# Patient Record
Sex: FEMALE | Race: WHITE | NOT HISPANIC OR LATINO | Employment: OTHER | URBAN - METROPOLITAN AREA
[De-identification: names, ages, dates, MRNs, and addresses within clinical notes are randomized per-mention and may not be internally consistent; named-entity substitution may affect disease eponyms.]

---

## 2021-05-15 ENCOUNTER — HOSPITAL ENCOUNTER (OUTPATIENT)
Dept: TELEMEDICINE | Facility: HOSPITAL | Age: 51
Discharge: HOME | End: 2021-05-15
Payer: MEDICAID

## 2021-05-15 DIAGNOSIS — R20.2 NUMBNESS AND TINGLING: ICD-10-CM

## 2021-05-15 DIAGNOSIS — R20.0 NUMBNESS AND TINGLING: ICD-10-CM

## 2021-05-15 PROCEDURE — 99203 OFFICE O/P NEW LOW 30 MIN: CPT | Mod: 95,,, | Performed by: PSYCHIATRY & NEUROLOGY

## 2021-05-15 PROCEDURE — 99203 PR OFFICE/OUTPT VISIT, NEW, LEVL III, 30-44 MIN: ICD-10-PCS | Mod: 95,,, | Performed by: PSYCHIATRY & NEUROLOGY

## 2022-02-28 ENCOUNTER — TELEPHONE (OUTPATIENT)
Dept: BARIATRICS | Facility: CLINIC | Age: 52
End: 2022-02-28
Payer: MEDICAID

## 2022-02-28 NOTE — TELEPHONE ENCOUNTER
----- Message from Karin Meeks sent at 2/28/2022 11:34 AM CST -----  Regarding: scheduling  Contact: pt  Pt requesting a call back in regards to scheduling an appt.      Pt @ [phone] or [phone]

## 2022-05-22 ENCOUNTER — HOSPITAL ENCOUNTER (OUTPATIENT)
Dept: TELEMEDICINE | Facility: HOSPITAL | Age: 52
Discharge: HOME | End: 2022-05-22
Payer: MEDICAID

## 2022-05-22 DIAGNOSIS — I63.512 ACUTE ISCHEMIC LEFT MCA STROKE: ICD-10-CM

## 2022-05-22 PROCEDURE — 99205 OFFICE O/P NEW HI 60 MIN: CPT | Mod: 95,,, | Performed by: PSYCHIATRY & NEUROLOGY

## 2022-05-22 PROCEDURE — 99205 PR OFFICE/OUTPT VISIT, NEW, LEVL V, 60-74 MIN: ICD-10-PCS | Mod: 95,,, | Performed by: PSYCHIATRY & NEUROLOGY

## 2022-05-22 NOTE — SUBJECTIVE & OBJECTIVE
Woke up with symptoms?: no    Recent bleeding noted: no  Does the patient take any Blood Thinners? yes  Medications: Antiplatelets:  aspirin and clopidogrel/Plavix      Past Medical History: hypertension, diabetes, hyperlipidemia, MI/CAD, stroke and Heart Problems    Past Surgical History: 3x cardiac stent    Family History: no relevant history    Social History: no smoking, no drinking, no drugs    Allergies: Allergies have not been reviewed see chart    Review of Systems   Review of Symptoms:   Constitutional: Denies fevers or chills.  ENT: no hearing difficulty, no visual changes  Pulmonary: Denies shortness of breath or cough.  Cardiology: Denies chest pain or palpitations.  GI: Denies abdominal pain or constipation.  Neurologic: right face numbness and weakness to right body   : no dysuria  Musk: no muscle pain, no joint pain  Psych: no hallucinations      Objective:   Vitals: There were no vitals taken for this visit. Height: 5.0, Weight: 283 lbs, BP: 132/66, Respiratory Rate: 18 and Heart Rate: 78    CT READ: Yes  No hemmorhage. No mass effect. No early infarct signs.  prior strokes, lacunes    Physical Exam    Physical Exam:  GA: Alert, comfortable, no acute distress.   Pulmonary: normal chest rise  Abdominal: no distension appreciated  Skin: no visible lesions on exposed skin  obese  Neuro:  --GCS: 15  --Mental Status:  aox3  --CN II-XII grossly intact.   --EOMI   --brainstem: intact  --Motor:right leg drift; difficulty lifting leg on right that feels like yesterday only worse  --sensory: decreased sensation to right side  --Reflexes: not tested  --Gait: deferred

## 2022-05-22 NOTE — HPI
52 yo F with HTN, HLD, DM, MI, prior CVAs, ex cardiac stents on asa and plvx. Complaining of 2 days of intermittent right sided numbness and tingling and right sided weakness. This at 1100 she felt her right sided weakness was worse.

## 2022-05-22 NOTE — CONSULTS
[facility]  Vascular Neurology  Comprehensive Stroke Center  TeleVascular Neurology Acute Consultation Note      Consults    Consulting Provider: JUNG ARNOLD  Current Providers  No providers found    Patient Location: [facility] - [facility] Emergency Department  Spoke hospital nurse at bedside with patient assisting consultant.     Patient information was obtained from patient.         Assessment/Plan:       Diagnoses:   Acute ischemic left MCA stroke  50 yo F with HTN, HLD, DM, MI, prior CVAs, ex cardiac stents on asa and plvx. Complaining of 2 days of intermittent right sided numbness and tingling and right sided weakness. This at 1100 she felt her right sided weakness was worse.    Antithrombotics for secondary stroke prevention: Antiplatelets: Aspirin: 81 mg daily  Clopidogrel: 75 mg daily    Statins for secondary stroke prevention and hyperlipidemia, if present:   Statins: Atorvastatin- 80 mg daily    Aggressive risk factor modification: HTN, DM, HLD, Diet, Exercise, Obesity     Rehab efforts: The patient has been evaluated by a stroke team provider and the therapy needs have been fully considered based off the presenting complaints and exam findings. The following therapy evaluations are needed: PT evaluate and treat, OT evaluate and treat    Diagnostics ordered/pending: CTA Head to assess vasculature , CTA Neck/Arch to assess vasculature, HgbA1C to assess blood glucose levels, Lipid Profile to assess cholesterol levels, MRI head without contrast to assess brain parenchyma, TTE to assess cardiac function/status , TSH to assess thyroid function    VTE prophylaxis: Heparin 5000 units SQ every 8 hours    BP parameters: Infarct: No intervention, SBP <220 uunless MRI negative for acute stroke            STROKE DOCUMENTATION     Acute Stroke Times:   Acute Stroke Times   Last Known Normal Date: 05/21/22  Last Known Normal Time:  1100  Symptom Onset Date: 05/22/22  Symptom Onset Time: 1100  Stroke Team Called Date: 05/22/22  Stroke Team Called Time: 1443  Stroke Team Arrival Date: 05/22/22  Stroke Team Arrival Time: 1145  CT Interpretation Time: 1449  Alteplase Recommended: No  Thrombectomy Recommended: No    NIH Scale:  Interval: baseline  1a. Level of Consciousness: 0-->Alert, keenly responsive  1b. LOC Questions: 0-->Answers both questions correctly  1c. LOC Commands: 0-->Performs both tasks correctly  2. Best Gaze: 0-->Normal  3. Visual: 0-->No visual loss  4. Facial Palsy: 0-->Normal symmetrical movements  5a. Motor Arm, Left: 0-->No drift, limb holds 90 (or 45) degrees for full 10 secs  5b. Motor Arm, Right: 0-->No drift, limb holds 90 (or 45) degrees for full 10 secs  6a. Motor Leg, Left: 0-->No drift, leg holds 30 degree position for full 5 secs  6b. Motor Leg, Right: 1-->Drift, leg falls by the end of the 5-sec period but does not hit bed  7. Limb Ataxia: 0-->Absent  8. Sensory: 1-->Mild-to-moderate sensory loss, patient feels pinprick is less sharp or is dull on the affected side, or there is a loss of superficial pain with pinprick, but patient is aware of being touched  9. Best Language: 0-->No aphasia, normal  10. Dysarthria: 0-->Normal  11. Extinction and Inattention (formerly Neglect): 0-->No abnormality  Total (NIH Stroke Scale): 2     Modified Buffalo Score: 0  Boomer Coma Scale:15   ABCD2 Score:    [id number] Score:   HAS -BLED Score:   ICH Score:   Hunt & Rodríguez Classification:       There were no vitals taken for this visit.  Alteplase Eligible?: Yes  Alteplase Recommendation: Alteplase not recommended due to Outside of treatment window  and Unknown/unclear onset   Possible Interventional Revascularization Candidate? No; no significant neurologic deficit (NIHSS <6)  and No; at this time symptoms not suggestive of large vessel occlusion    Disposition Recommendation: admit to inpatient    Subjective:     History of Present  Illness:  50 yo F with HTN, HLD, DM, MI, prior CVAs, ex cardiac stents on asa and plvx. Complaining of 2 days of intermittent right sided numbness and tingling and right sided weakness. This at 1100 she felt her right sided weakness was worse.        Woke up with symptoms?: no    Recent bleeding noted: no  Does the patient take any Blood Thinners? yes  Medications: Antiplatelets:  aspirin and clopidogrel/Plavix      Past Medical History: hypertension, diabetes, hyperlipidemia, MI/CAD, stroke and Heart Problems    Past Surgical History: 3x cardiac stent    Family History: no relevant history    Social History: no smoking, no drinking, no drugs    Allergies: Allergies have not been reviewed see chart    Review of Systems   Review of Symptoms:   Constitutional: Denies fevers or chills.  ENT: no hearing difficulty, no visual changes  Pulmonary: Denies shortness of breath or cough.  Cardiology: Denies chest pain or palpitations.  GI: Denies abdominal pain or constipation.  Neurologic: right face numbness and weakness to right body   : no dysuria  Musk: no muscle pain, no joint pain  Psych: no hallucinations      Objective:   Vitals: There were no vitals taken for this visit. Height: 5.0, Weight: 283 lbs, BP: 132/66, Respiratory Rate: 18 and Heart Rate: 78    CT READ: Yes  No hemmorhage. No mass effect. No early infarct signs.  prior strokes, lacunes    Physical Exam    Physical Exam:  GA: Alert, comfortable, no acute distress.   Pulmonary: normal chest rise  Abdominal: no distension appreciated  Skin: no visible lesions on exposed skin  obese  Neuro:  --GCS: 15  --Mental Status:  aox3  --CN II-XII grossly intact.   --EOMI   --brainstem: intact  --Motor:right leg drift; difficulty lifting leg on right that feels like yesterday only worse  --sensory: decreased sensation to right side  --Reflexes: not tested  --Gait: deferred          Recommended the emergency room physician to have a brief discussion with the patient  and/or family if available regarding the  risks and benefits of treatment, and to briefly document the occurrence of that discussion in his clinical encounter note.     The attending portion of this evaluation, treatment, and documentation was performed per Yemi Mcgrath MD via audiovisual.    Billing code:  (non-intervention mild to moderate stroke, TIA, some mimics)    · This patient has a critical neurological condition/illness, with some potential for high morbidity and mortality.  · There is a moderate probability for acute neurological change leading to clinical and possibly life-threatening deterioration requiring highest level of physician preparedness for urgent intervention.  · Care was coordinated with other physicians involved in the patient's care.  · Radiologic studies and laboratory data were reviewed and interpreted, and plan of care was re-assessed based on the results.  · Diagnosis, treatment options and prognosis may have been discussed with the patient and/or family members or caregiver.      In your opinion, this was a: Tier 2 Van Negative    Consult End Time: 3:57 PM     Yemi Mcgrath MD  [facility]  Vascular Neurology   [facility]

## 2022-06-20 ENCOUNTER — OFFICE VISIT (OUTPATIENT)
Dept: OBSTETRICS AND GYNECOLOGY | Facility: CLINIC | Age: 52
End: 2022-06-20
Payer: MEDICAID

## 2022-06-20 VITALS
HEIGHT: 60 IN | WEIGHT: 284 LBS | BODY MASS INDEX: 55.76 KG/M2 | HEART RATE: 77 BPM | DIASTOLIC BLOOD PRESSURE: 83 MMHG | SYSTOLIC BLOOD PRESSURE: 132 MMHG

## 2022-06-20 DIAGNOSIS — Z01.419 WOMEN'S ANNUAL ROUTINE GYNECOLOGICAL EXAMINATION: Primary | ICD-10-CM

## 2022-06-20 DIAGNOSIS — Z12.4 CERVICAL CANCER SCREENING: ICD-10-CM

## 2022-06-20 PROCEDURE — 1159F PR MEDICATION LIST DOCUMENTED IN MEDICAL RECORD: ICD-10-PCS | Mod: CPTII,S$GLB,, | Performed by: STUDENT IN AN ORGANIZED HEALTH CARE EDUCATION/TRAINING PROGRAM

## 2022-06-20 PROCEDURE — 3075F SYST BP GE 130 - 139MM HG: CPT | Mod: CPTII,S$GLB,, | Performed by: STUDENT IN AN ORGANIZED HEALTH CARE EDUCATION/TRAINING PROGRAM

## 2022-06-20 PROCEDURE — 99386 PREV VISIT NEW AGE 40-64: CPT | Mod: S$GLB,,, | Performed by: STUDENT IN AN ORGANIZED HEALTH CARE EDUCATION/TRAINING PROGRAM

## 2022-06-20 PROCEDURE — 99386 PR PREVENTIVE VISIT,NEW,40-64: ICD-10-PCS | Mod: S$GLB,,, | Performed by: STUDENT IN AN ORGANIZED HEALTH CARE EDUCATION/TRAINING PROGRAM

## 2022-06-20 PROCEDURE — 1160F RVW MEDS BY RX/DR IN RCRD: CPT | Mod: CPTII,S$GLB,, | Performed by: STUDENT IN AN ORGANIZED HEALTH CARE EDUCATION/TRAINING PROGRAM

## 2022-06-20 PROCEDURE — 3008F PR BODY MASS INDEX (BMI) DOCUMENTED: ICD-10-PCS | Mod: CPTII,S$GLB,, | Performed by: STUDENT IN AN ORGANIZED HEALTH CARE EDUCATION/TRAINING PROGRAM

## 2022-06-20 PROCEDURE — 1160F PR REVIEW ALL MEDS BY PRESCRIBER/CLIN PHARMACIST DOCUMENTED: ICD-10-PCS | Mod: CPTII,S$GLB,, | Performed by: STUDENT IN AN ORGANIZED HEALTH CARE EDUCATION/TRAINING PROGRAM

## 2022-06-20 PROCEDURE — 3079F PR MOST RECENT DIASTOLIC BLOOD PRESSURE 80-89 MM HG: ICD-10-PCS | Mod: CPTII,S$GLB,, | Performed by: STUDENT IN AN ORGANIZED HEALTH CARE EDUCATION/TRAINING PROGRAM

## 2022-06-20 PROCEDURE — 3079F DIAST BP 80-89 MM HG: CPT | Mod: CPTII,S$GLB,, | Performed by: STUDENT IN AN ORGANIZED HEALTH CARE EDUCATION/TRAINING PROGRAM

## 2022-06-20 PROCEDURE — 3075F PR MOST RECENT SYSTOLIC BLOOD PRESS GE 130-139MM HG: ICD-10-PCS | Mod: CPTII,S$GLB,, | Performed by: STUDENT IN AN ORGANIZED HEALTH CARE EDUCATION/TRAINING PROGRAM

## 2022-06-20 PROCEDURE — 3008F BODY MASS INDEX DOCD: CPT | Mod: CPTII,S$GLB,, | Performed by: STUDENT IN AN ORGANIZED HEALTH CARE EDUCATION/TRAINING PROGRAM

## 2022-06-20 PROCEDURE — 1159F MED LIST DOCD IN RCRD: CPT | Mod: CPTII,S$GLB,, | Performed by: STUDENT IN AN ORGANIZED HEALTH CARE EDUCATION/TRAINING PROGRAM

## 2022-06-20 RX ORDER — PRAZOSIN HYDROCHLORIDE 1 MG/1
1 CAPSULE ORAL 2 TIMES DAILY
COMMUNITY
End: 2022-09-06

## 2022-06-20 RX ORDER — ALBUTEROL SULFATE 90 UG/1
AEROSOL, METERED RESPIRATORY (INHALATION)
COMMUNITY
Start: 2022-04-25

## 2022-06-20 RX ORDER — FAMOTIDINE 20 MG/1
TABLET, FILM COATED ORAL
COMMUNITY
End: 2022-09-06

## 2022-06-20 RX ORDER — ALBUTEROL SULFATE 0.83 MG/ML
2.5 SOLUTION RESPIRATORY (INHALATION)
COMMUNITY
End: 2022-09-06 | Stop reason: SDUPTHER

## 2022-06-20 RX ORDER — PANTOPRAZOLE SODIUM 40 MG/1
40 TABLET, DELAYED RELEASE ORAL DAILY
COMMUNITY
Start: 2022-06-03

## 2022-06-20 RX ORDER — LANOLIN ALCOHOL/MO/W.PET/CERES
1 CREAM (GRAM) TOPICAL DAILY
COMMUNITY
Start: 2022-04-25 | End: 2023-10-13

## 2022-06-20 RX ORDER — POTASSIUM CHLORIDE 20 MEQ/1
20 TABLET, EXTENDED RELEASE ORAL DAILY
COMMUNITY
Start: 2022-06-16 | End: 2022-09-05 | Stop reason: CLARIF

## 2022-06-20 RX ORDER — ONDANSETRON 4 MG/1
TABLET, ORALLY DISINTEGRATING ORAL
COMMUNITY
Start: 2022-01-19 | End: 2022-09-06

## 2022-06-20 RX ORDER — FUROSEMIDE 40 MG/1
TABLET ORAL
COMMUNITY
Start: 2022-05-10 | End: 2023-10-13

## 2022-06-20 RX ORDER — LATANOPROST 50 UG/ML
SOLUTION/ DROPS OPHTHALMIC
COMMUNITY
Start: 2022-06-15 | End: 2023-10-13

## 2022-06-20 RX ORDER — EZETIMIBE 10 MG/1
10 TABLET ORAL DAILY
COMMUNITY
Start: 2022-06-03

## 2022-06-20 RX ORDER — BUPROPION HYDROCHLORIDE 150 MG/1
150 TABLET ORAL DAILY
COMMUNITY
Start: 2022-06-03 | End: 2023-10-13

## 2022-06-20 RX ORDER — DULOXETIN HYDROCHLORIDE 30 MG/1
CAPSULE, DELAYED RELEASE ORAL
COMMUNITY
Start: 2022-06-03 | End: 2023-10-13

## 2022-06-20 RX ORDER — METHOCARBAMOL 750 MG/1
TABLET, FILM COATED ORAL
COMMUNITY
Start: 2022-06-09 | End: 2022-09-08

## 2022-06-20 RX ORDER — OXCARBAZEPINE 300 MG/1
300 TABLET, FILM COATED ORAL 2 TIMES DAILY
COMMUNITY
Start: 2022-05-18 | End: 2023-10-13

## 2022-06-20 RX ORDER — AMLODIPINE BESYLATE 10 MG/1
10 TABLET ORAL DAILY
COMMUNITY
Start: 2022-05-17 | End: 2023-10-13

## 2022-06-20 RX ORDER — NITROGLYCERIN 0.4 MG/1
TABLET SUBLINGUAL
COMMUNITY
Start: 2022-04-25 | End: 2022-09-08 | Stop reason: SDUPTHER

## 2022-06-20 RX ORDER — HYDROXYZINE HYDROCHLORIDE 50 MG/1
TABLET, FILM COATED ORAL
COMMUNITY
Start: 2022-05-17

## 2022-06-20 RX ORDER — INSULIN LISPRO 100 [IU]/ML
15 INJECTION, SOLUTION INTRAVENOUS; SUBCUTANEOUS 4 TIMES DAILY
COMMUNITY
End: 2023-10-13

## 2022-06-20 RX ORDER — ISOSORBIDE MONONITRATE 60 MG/1
60 TABLET, EXTENDED RELEASE ORAL DAILY
COMMUNITY
Start: 2022-01-27 | End: 2022-09-08

## 2022-06-20 RX ORDER — CARVEDILOL 3.12 MG/1
3.12 TABLET ORAL 2 TIMES DAILY WITH MEALS
COMMUNITY
Start: 2022-06-03 | End: 2022-09-08

## 2022-06-20 RX ORDER — ATORVASTATIN CALCIUM 40 MG/1
20 TABLET, FILM COATED ORAL NIGHTLY
COMMUNITY
Start: 2022-04-19

## 2022-06-20 RX ORDER — QUETIAPINE FUMARATE 300 MG/1
300 TABLET, FILM COATED ORAL DAILY
COMMUNITY
Start: 2022-06-03 | End: 2023-10-13

## 2022-06-20 RX ORDER — PRIMIDONE 50 MG/1
50 TABLET ORAL 2 TIMES DAILY
Status: ON HOLD | COMMUNITY
Start: 2022-06-03 | End: 2024-02-05 | Stop reason: HOSPADM

## 2022-06-20 RX ORDER — NYSTATIN 100000 U/G
CREAM TOPICAL
COMMUNITY
Start: 2022-04-05 | End: 2022-09-06

## 2022-06-20 RX ORDER — INSULIN GLARGINE 100 [IU]/ML
INJECTION, SOLUTION SUBCUTANEOUS
COMMUNITY
Start: 2022-06-13 | End: 2022-09-05 | Stop reason: ALTCHOICE

## 2022-06-20 NOTE — PROGRESS NOTES
Chief Complaint: Annual Exam    HPI: 51 y.o.  here for Annual Exam. She has no GYN complaints today. Reports a h/o abn pap smear prior to hysterectomy.     Review of Systems   Constitutional: Negative for chills and fever.   HENT: Negative for congestion and sore throat.    Respiratory: Negative for cough and shortness of breath.    Cardiovascular: Negative for chest pain and palpitations.   Gastrointestinal: Negative for abdominal pain, constipation, diarrhea, nausea and vomiting.   Genitourinary: Negative for dysuria, flank pain, frequency, hematuria and urgency.   Musculoskeletal: Negative for back pain.   Skin: Negative for itching and rash.   Neurological: Negative for headaches.   All other systems reviewed and are negative.    Past Medical History: see media for PMH list  Past Surgical History see media for PSH list  Past Gyn History: Denies prior abnormal pap smears, denies STD's, menstrual history as above.   Contraception History: menopausal, s/p hysterectomy  Social History: denies t/d/e  Family History denies breast, colon, ovarian, or uterine cancer  See media for pt All and meds    Physical Exam:  Vitals:    06/20/22 1332   BP: 132/83   Pulse: 77     Body mass index is 55.46 kg/m².  Physical Exam  Vitals reviewed. Exam conducted with a chaperone present.   Constitutional:       General: She is not in acute distress.     Appearance: She is obese.   HENT:      Head: Normocephalic and atraumatic.   Pulmonary:      Effort: Pulmonary effort is normal. No respiratory distress.   Chest:   Breasts: Breasts are symmetrical.      Right: No inverted nipple, mass, nipple discharge, skin change, tenderness, axillary adenopathy or supraclavicular adenopathy.      Left: No inverted nipple, mass, nipple discharge, skin change, tenderness, axillary adenopathy or supraclavicular adenopathy.       Abdominal:      General: There is no distension.      Palpations: Abdomen is soft.      Tenderness: There is no abdominal  tenderness.   Genitourinary:     Comments: NEFG, no masses or lesions, vagina pink, no VB or VD, cuff intact  Musculoskeletal:         General: No swelling or tenderness. Normal range of motion.      Cervical back: Normal range of motion and neck supple.   Lymphadenopathy:      Upper Body:      Right upper body: No supraclavicular or axillary adenopathy.      Left upper body: No supraclavicular or axillary adenopathy.   Neurological:      General: No focal deficit present.      Mental Status: She is alert and oriented to person, place, and time.        ASSESSMENT:   Patient is a 51 y.o.  who presents for annual exam     Patient Active Problem List   Diagnosis    Numbness and tingling    Acute ischemic left MCA stroke     PLAN:  Reviewed 6 page list of pt PMH, PSH, All, and meds today for 30 min   Pap performed today  Mammogram - utd  TSH, Lipid profile, Diabetes screening, Colon cancer screening followed by PCP  Counseling on self-breast exams, diet, and exercise.  RTC in 1 yr for annual

## 2022-06-23 ENCOUNTER — PATIENT MESSAGE (OUTPATIENT)
Dept: OBSTETRICS AND GYNECOLOGY | Facility: CLINIC | Age: 52
End: 2022-06-23
Payer: MEDICAID

## 2022-06-24 PROBLEM — R87.612 LGSIL ON PAP SMEAR OF CERVIX: Status: ACTIVE | Noted: 2022-06-24

## 2022-06-30 ENCOUNTER — PATIENT MESSAGE (OUTPATIENT)
Dept: OBSTETRICS AND GYNECOLOGY | Facility: CLINIC | Age: 52
End: 2022-06-30
Payer: MEDICAID

## 2022-09-06 PROBLEM — M19.90 DEGENERATIVE JOINT DISEASE: Status: ACTIVE | Noted: 2022-09-06

## 2022-09-06 PROBLEM — J45.909 ASTHMA: Status: ACTIVE | Noted: 2022-09-06

## 2022-09-06 PROBLEM — I10 HYPERTENSION: Status: ACTIVE | Noted: 2022-09-06

## 2022-09-06 PROBLEM — I50.9 CONGESTIVE HEART FAILURE: Status: ACTIVE | Noted: 2022-09-06

## 2022-09-06 PROBLEM — F32.9 MAJOR DEPRESSIVE DISORDER: Status: ACTIVE | Noted: 2022-09-06

## 2022-09-06 PROBLEM — I63.9 CVA (CEREBRAL VASCULAR ACCIDENT): Status: ACTIVE | Noted: 2022-05-22

## 2022-09-06 PROBLEM — K21.9 GERD (GASTROESOPHAGEAL REFLUX DISEASE): Status: ACTIVE | Noted: 2022-09-06

## 2022-09-06 PROBLEM — R45.851 SUICIDAL IDEATION: Status: ACTIVE | Noted: 2022-09-06

## 2022-09-06 PROBLEM — E11.9 DIABETES: Status: ACTIVE | Noted: 2022-09-06

## 2022-09-06 PROBLEM — J44.9 COPD (CHRONIC OBSTRUCTIVE PULMONARY DISEASE): Status: ACTIVE | Noted: 2022-09-06

## 2022-09-06 PROBLEM — M19.90 DEGENERATIVE JOINT DISEASE: Status: RESOLVED | Noted: 2022-09-06 | Resolved: 2022-09-06

## 2022-09-06 PROBLEM — I25.10 CORONARY ARTERY DISEASE: Status: ACTIVE | Noted: 2022-09-06

## 2022-09-06 PROBLEM — R07.89 OTHER CHEST PAIN: Status: ACTIVE | Noted: 2022-09-06

## 2022-09-06 PROBLEM — G43.909 MIGRAINE: Status: RESOLVED | Noted: 2022-09-06 | Resolved: 2022-09-06

## 2022-09-06 PROBLEM — R07.9 CHEST PAIN: Status: ACTIVE | Noted: 2022-09-06

## 2022-09-06 PROBLEM — K44.9 HIATAL HERNIA: Status: ACTIVE | Noted: 2022-09-06

## 2022-09-06 PROBLEM — G70.00 MYASTHENIA GRAVIS: Status: ACTIVE | Noted: 2022-09-06

## 2022-09-06 PROBLEM — G43.909 MIGRAINE: Status: ACTIVE | Noted: 2022-09-06

## 2022-09-06 PROBLEM — M54.30 SCIATICA: Status: ACTIVE | Noted: 2022-09-06

## 2022-09-07 PROBLEM — I35.0 NONRHEUMATIC AORTIC VALVE STENOSIS: Status: ACTIVE | Noted: 2022-09-07

## 2022-09-07 PROBLEM — G47.33 OSA (OBSTRUCTIVE SLEEP APNEA): Status: ACTIVE | Noted: 2022-09-07

## 2022-09-07 PROBLEM — E66.01 MORBID OBESITY: Status: ACTIVE | Noted: 2022-09-07

## 2022-09-07 PROBLEM — D69.6 THROMBOCYTOPENIA: Status: ACTIVE | Noted: 2022-09-07

## 2022-09-08 PROBLEM — I25.118 CORONARY ARTERY DISEASE OF NATIVE ARTERY WITH STABLE ANGINA PECTORIS: Status: ACTIVE | Noted: 2022-09-06

## 2022-09-08 PROBLEM — Z79.4 TYPE 2 DIABETES MELLITUS WITH SKIN COMPLICATION, WITH LONG-TERM CURRENT USE OF INSULIN: Status: ACTIVE | Noted: 2022-09-06

## 2022-09-08 PROBLEM — E66.01 MORBID OBESITY WITH BMI OF 45.0-49.9, ADULT: Status: ACTIVE | Noted: 2022-09-07

## 2022-09-08 PROBLEM — F32.2 CURRENT SEVERE EPISODE OF MAJOR DEPRESSIVE DISORDER WITHOUT PSYCHOTIC FEATURES: Status: ACTIVE | Noted: 2022-09-06

## 2022-09-08 PROBLEM — E11.628 TYPE 2 DIABETES MELLITUS WITH SKIN COMPLICATION, WITH LONG-TERM CURRENT USE OF INSULIN: Status: ACTIVE | Noted: 2022-09-06

## 2022-09-08 PROBLEM — J45.20 MILD INTERMITTENT ASTHMA WITHOUT COMPLICATION: Status: ACTIVE | Noted: 2022-09-06

## 2022-09-08 PROBLEM — T82.855A STENOSIS OF CORONARY STENT: Status: ACTIVE | Noted: 2022-09-08

## 2022-09-08 PROBLEM — Z00.8 MEDICAL CLEARANCE FOR PSYCHIATRIC ADMISSION: Status: ACTIVE | Noted: 2022-09-08

## 2022-12-06 ENCOUNTER — PATIENT MESSAGE (OUTPATIENT)
Dept: OBSTETRICS AND GYNECOLOGY | Facility: CLINIC | Age: 52
End: 2022-12-06
Payer: MEDICAID

## 2022-12-12 PROBLEM — Z00.8 MEDICAL CLEARANCE FOR PSYCHIATRIC ADMISSION: Status: RESOLVED | Noted: 2022-09-08 | Resolved: 2022-12-12

## 2022-12-12 PROBLEM — I63.9 CVA (CEREBRAL VASCULAR ACCIDENT): Status: RESOLVED | Noted: 2022-05-22 | Resolved: 2022-12-12

## 2023-10-02 ENCOUNTER — OFFICE VISIT (OUTPATIENT)
Dept: OBSTETRICS AND GYNECOLOGY | Facility: CLINIC | Age: 53
End: 2023-10-02
Payer: MEDICAID

## 2023-10-02 VITALS — WEIGHT: 256 LBS | BODY MASS INDEX: 43.94 KG/M2 | DIASTOLIC BLOOD PRESSURE: 120 MMHG | SYSTOLIC BLOOD PRESSURE: 174 MMHG

## 2023-10-02 DIAGNOSIS — Z01.419 WELL WOMAN EXAM WITH ROUTINE GYNECOLOGICAL EXAM: Primary | ICD-10-CM

## 2023-10-02 PROCEDURE — 3008F BODY MASS INDEX DOCD: CPT | Mod: CPTII,S$GLB,, | Performed by: STUDENT IN AN ORGANIZED HEALTH CARE EDUCATION/TRAINING PROGRAM

## 2023-10-02 PROCEDURE — 99396 PR PREVENTIVE VISIT,EST,40-64: ICD-10-PCS | Mod: S$GLB,,, | Performed by: STUDENT IN AN ORGANIZED HEALTH CARE EDUCATION/TRAINING PROGRAM

## 2023-10-02 PROCEDURE — 3008F PR BODY MASS INDEX (BMI) DOCUMENTED: ICD-10-PCS | Mod: CPTII,S$GLB,, | Performed by: STUDENT IN AN ORGANIZED HEALTH CARE EDUCATION/TRAINING PROGRAM

## 2023-10-02 PROCEDURE — 1159F PR MEDICATION LIST DOCUMENTED IN MEDICAL RECORD: ICD-10-PCS | Mod: CPTII,S$GLB,, | Performed by: STUDENT IN AN ORGANIZED HEALTH CARE EDUCATION/TRAINING PROGRAM

## 2023-10-02 PROCEDURE — 1160F PR REVIEW ALL MEDS BY PRESCRIBER/CLIN PHARMACIST DOCUMENTED: ICD-10-PCS | Mod: CPTII,S$GLB,, | Performed by: STUDENT IN AN ORGANIZED HEALTH CARE EDUCATION/TRAINING PROGRAM

## 2023-10-02 PROCEDURE — 99396 PREV VISIT EST AGE 40-64: CPT | Mod: S$GLB,,, | Performed by: STUDENT IN AN ORGANIZED HEALTH CARE EDUCATION/TRAINING PROGRAM

## 2023-10-02 PROCEDURE — 3080F PR MOST RECENT DIASTOLIC BLOOD PRESSURE >= 90 MM HG: ICD-10-PCS | Mod: CPTII,S$GLB,, | Performed by: STUDENT IN AN ORGANIZED HEALTH CARE EDUCATION/TRAINING PROGRAM

## 2023-10-02 PROCEDURE — 1160F RVW MEDS BY RX/DR IN RCRD: CPT | Mod: CPTII,S$GLB,, | Performed by: STUDENT IN AN ORGANIZED HEALTH CARE EDUCATION/TRAINING PROGRAM

## 2023-10-02 PROCEDURE — 1159F MED LIST DOCD IN RCRD: CPT | Mod: CPTII,S$GLB,, | Performed by: STUDENT IN AN ORGANIZED HEALTH CARE EDUCATION/TRAINING PROGRAM

## 2023-10-02 PROCEDURE — 3080F DIAST BP >= 90 MM HG: CPT | Mod: CPTII,S$GLB,, | Performed by: STUDENT IN AN ORGANIZED HEALTH CARE EDUCATION/TRAINING PROGRAM

## 2023-10-02 PROCEDURE — 4010F ACE/ARB THERAPY RXD/TAKEN: CPT | Mod: CPTII,S$GLB,, | Performed by: STUDENT IN AN ORGANIZED HEALTH CARE EDUCATION/TRAINING PROGRAM

## 2023-10-02 PROCEDURE — 3077F SYST BP >= 140 MM HG: CPT | Mod: CPTII,S$GLB,, | Performed by: STUDENT IN AN ORGANIZED HEALTH CARE EDUCATION/TRAINING PROGRAM

## 2023-10-02 PROCEDURE — 4010F PR ACE/ARB THEARPY RXD/TAKEN: ICD-10-PCS | Mod: CPTII,S$GLB,, | Performed by: STUDENT IN AN ORGANIZED HEALTH CARE EDUCATION/TRAINING PROGRAM

## 2023-10-02 PROCEDURE — 3077F PR MOST RECENT SYSTOLIC BLOOD PRESSURE >= 140 MM HG: ICD-10-PCS | Mod: CPTII,S$GLB,, | Performed by: STUDENT IN AN ORGANIZED HEALTH CARE EDUCATION/TRAINING PROGRAM

## 2023-10-02 RX ORDER — ESTRADIOL 0.1 MG/G
CREAM VAGINAL
Qty: 42.5 G | Refills: 0 | Status: SHIPPED | OUTPATIENT
Start: 2023-10-02 | End: 2023-12-06 | Stop reason: SDUPTHER

## 2023-10-02 NOTE — PROGRESS NOTES
Chief Complaint: Annual Exam    HPI: 52 y.o.  here for Annual Exam.  Patient doing well today.  She does report some discomfort with intercourse.  She reports difficulties side having intercourse certain positions secondary to anal leakage.  She does report some vaginal dryness and irritation as well.  She has no other complaints today.    Review of Systems   Constitutional:  Negative for chills and fever.   HENT:  Negative for congestion and sore throat.    Respiratory:  Negative for cough and shortness of breath.    Cardiovascular:  Negative for chest pain and palpitations.   Gastrointestinal:  Negative for abdominal pain, nausea and vomiting.   Genitourinary:  Negative for dysuria, frequency and urgency.   Musculoskeletal:  Negative for back pain.   Skin:  Negative for itching and rash.   Neurological:  Negative for headaches.   All other systems reviewed and are negative.    Past Medical History:   Diagnosis Date    Abnormal Pap smear of cervix     Anxiety and depression     Anxiety and depression     Arthritis     Asthma     Bipolar disorder, unspecified     Constipation     COPD (chronic obstructive pulmonary disease)     Diabetes mellitus     Disorder of kidney and ureter     Ear problems     Eye problems     H/O bladder problems     Head trauma     Headache     Heart disease     History of stomach ulcers     Hyperlipidemia     IBS (irritable bowel syndrome)     Liver disease     Migraine 2022    Obesity     Osteoporosis     Schizophrenia, unspecified     Seizures     STD (female)     Stroke     Urinary incontinence      Past Surgical History:   Procedure Laterality Date    CARDIAC CATHETERIZATION      CATARACT EXTRACTION       SECTION      CHOLECYSTECTOMY      HYSTERECTOMY      KIDNEY SURGERY      TUBAL LIGATION       Past OB History:   Past Gyn History: Denies prior abnormal pap smears, denies STD's, menstrual history as above.   Contraception History: menopausal, s/p  hysterectomy  Social History: denies t/d/e  Family History denies breast, colon, ovarian, or uterine cancer  Review of patient's allergies indicates:   Allergen Reactions    Asa [aspirin]     Benadryl allergy decongestant     Buspar [buspirone]     Clindamycin     Hydrocodone     Levofloxacin Hives    Nsaids (non-steroidal anti-inflammatory drug)     Oxycodone Hives    Penicillins     Sulfa (sulfonamide antibiotics)     Timolol     Combigan [brimonidine-timolol] Rash     Current Outpatient Medications   Medication Instructions    albuterol (PROVENTIL/VENTOLIN HFA) 90 mcg/actuation inhaler INHALE 2 puffs BY MOUTH 4 TIMES A DAY AS NEEDED SHORTNESS OF BREATH or FOR WHEEZING    amLODIPine (NORVASC) 10 mg, Oral, Daily    aspirin (ECOTRIN) 81 mg, Oral, Daily    atorvastatin (LIPITOR) 20 mg, Oral, Nightly    buPROPion (WELLBUTRIN XL) 150 mg, Oral, Daily    carvediloL (COREG) 25 mg, Oral, 2 times daily with meals    clopidogreL (PLAVIX) 75 mg, Oral, Daily    DULoxetine (CYMBALTA) 30 MG capsule TAKE 1 CAPSULE BY MOUTH ONCE DAILY AS DIRECTED thank hina -)    ezetimibe (ZETIA) 10 mg, Oral, Daily    fluticasone propionate (FLONASE NASL) 2 sprays, Each Nostril, Daily    furosemide (LASIX) 40 MG tablet TAKE 1 TABLET BY MOUTH DAILY FOR SWELLING OR FLUID RETENTION    hydrOXYzine (ATARAX) 50 MG tablet TAKE 1 TABLET BY MOUTH AT BEDTIME AS DIRECTED thank hina -)    insulin glargine (LANTUS) 45 Units, Subcutaneous, 2 times daily    insulin lispro 15 Units, 4 times daily    isosorbide mononitrate (IMDUR) 90 mg, Oral, Daily    latanoprost 0.005 % ophthalmic solution PLACE ONE DROP INTO EACH EYE EVERY NIGHT    magnesium oxide (MAG-OX) 400 mg (241.3 mg magnesium) tablet 1 tablet, Oral, Daily    nitroGLYCERIN (NITROSTAT) 0.4 mg, Sublingual, Every 5 min PRN    OXcarbazepine (TRILEPTAL) 300 mg, Oral, 2 times daily    pantoprazole (PROTONIX) 40 mg, Oral, Daily    potassium chloride SA (K-DUR,KLOR-CON) 20 MEQ tablet 20 mEq, Oral, Daily     primidone (MYSOLINE) 50 mg, Oral, 2 times daily    pyridostigmine (MESTINON) 60 mg, Oral, Daily PRN    QUEtiapine (SEROQUEL) 300 mg, Oral, Daily     Physical Exam:  Vitals:    10/02/23 1413   BP: (!) 174/120     Body mass index is 43.94 kg/m².  Physical Exam  Vitals reviewed. Exam conducted with a chaperone present.   Constitutional:       General: She is not in acute distress.     Appearance: Normal appearance. She is obese.   HENT:      Head: Normocephalic and atraumatic.   Eyes:      Extraocular Movements: Extraocular movements intact.      Pupils: Pupils are equal, round, and reactive to light.   Pulmonary:      Effort: Pulmonary effort is normal. No respiratory distress.   Chest:   Breasts:     Breasts are symmetrical.      Right: No inverted nipple, mass, nipple discharge, skin change or tenderness.      Left: No inverted nipple, mass, nipple discharge, skin change or tenderness.   Abdominal:      General: There is no distension.      Palpations: Abdomen is soft.      Tenderness: There is no abdominal tenderness.   Genitourinary:     Comments: Normal external female genitalia, no masses or lesions, vagina pink with rugated, no vaginal bleeding or discharge, not friable cervix  Musculoskeletal:         General: No swelling or tenderness. Normal range of motion.      Cervical back: Normal range of motion and neck supple.   Lymphadenopathy:      Upper Body:      Right upper body: No supraclavicular or axillary adenopathy.      Left upper body: No supraclavicular or axillary adenopathy.   Skin:     General: Skin is warm and dry.   Neurological:      General: No focal deficit present.      Mental Status: She is alert and oriented to person, place, and time.          ASSESSMENT:   Patient is a 52 y.o.  who presents for annual exam     Patient Active Problem List   Diagnosis    Numbness and tingling    LGSIL on Pap smear of cervix, pap in 1 yr    Diabetes mellitus    Primary hypertension    Mild intermittent  asthma without complication    COPD (chronic obstructive pulmonary disease)    Current severe episode of major depressive disorder without psychotic features    Gastroesophageal reflux disease without esophagitis    Hiatal hernia    Sciatica    Suicidal ideation    Myasthenia gravis    Coronary artery disease of native artery with stable angina pectoris    Chest pain    Morbid obesity with BMI of 45.0-49.9, adult    Thrombocytopenia    KEANU (obstructive sleep apnea)    Nonrheumatic aortic valve stenosis    Stenosis of coronary stent     PLAN:  Pap on RTC  Mammogram - ordered   Estrace cream for vaginally atrophy  TSH, Lipid profile, Colon cancer screening - followed by PCP  Counseling on self-breast exams, diet, and exercise.

## 2023-12-06 ENCOUNTER — TELEPHONE (OUTPATIENT)
Dept: OBSTETRICS AND GYNECOLOGY | Facility: CLINIC | Age: 53
End: 2023-12-06
Payer: MEDICAID

## 2023-12-06 NOTE — TELEPHONE ENCOUNTER
Left voicemail for patient.    ----- Message -----  From: Jennifer Meeks  Sent: 12/5/2023   1:42 PM CST  To: [facility] Staff    Pt is calling to get a refill on estradioL (ESTRACE) 0.01 % (0.1 mg/gram) vaginal cream. Please call back as soon as possible at [phone]        [facility] [address] [address]  Phone: [phone] Fax: [phone]  Hours: Not open 24 hours            Thanks  SW

## 2023-12-07 RX ORDER — ESTRADIOL 0.1 MG/G
CREAM VAGINAL
Qty: 42.5 G | Refills: 0 | Status: SHIPPED | OUTPATIENT
Start: 2023-12-07 | End: 2024-01-03

## 2024-01-31 PROBLEM — R56.9 SEIZURES: Status: ACTIVE | Noted: 2024-01-31

## 2024-01-31 PROBLEM — E11.9 DM (DIABETES MELLITUS): Status: ACTIVE | Noted: 2024-01-31

## 2024-02-02 PROBLEM — E66.01 CLASS 3 SEVERE OBESITY DUE TO EXCESS CALORIES WITH BODY MASS INDEX (BMI) OF 45.0 TO 49.9 IN ADULT: Status: ACTIVE | Noted: 2024-02-02

## 2024-02-02 PROBLEM — E78.5 HYPERLIPIDEMIA: Status: ACTIVE | Noted: 2024-02-02

## 2024-02-02 PROBLEM — J45.909 ASTHMA: Status: ACTIVE | Noted: 2024-02-02

## 2024-02-02 PROBLEM — F32.A DEPRESSION: Status: ACTIVE | Noted: 2024-02-02

## 2024-02-02 PROBLEM — F41.9 ANXIETY: Status: ACTIVE | Noted: 2024-02-02

## 2024-02-05 PROBLEM — F44.5 PSYCHOGENIC NONEPILEPTIC SEIZURE: Status: ACTIVE | Noted: 2024-02-05
